# Patient Record
Sex: MALE | URBAN - METROPOLITAN AREA
[De-identification: names, ages, dates, MRNs, and addresses within clinical notes are randomized per-mention and may not be internally consistent; named-entity substitution may affect disease eponyms.]

---

## 2019-06-06 ENCOUNTER — HOSPITAL ENCOUNTER (OUTPATIENT)
Dept: PHYSICAL THERAPY | Age: 44
Setting detail: THERAPIES SERIES
Discharge: HOME | End: 2019-06-06
Payer: COMMERCIAL

## 2019-06-06 PROCEDURE — 97110 THERAPEUTIC EXERCISES: CPT

## 2019-06-06 PROCEDURE — 97161 PT EVAL LOW COMPLEX 20 MIN: CPT

## 2019-06-06 ASSESSMENT — PAIN SCALES - GENERAL: PAINLEVEL_OUTOF10: 2

## 2019-06-06 NOTE — FLOWSHEET NOTE
Physical Therapy Daily Treatment Note    Date:  2019    Patient Name:  Karlos Hewitt    :  1975  MRN: [id number]  Restrictions/Precautions:    Medical/Treatment Diagnosis Information:  · Diagnosis: Pars with spondylolisthesis, lumbar spondylosis  Insurance/Certification information:  PT Insurance Information: Ganesh  Physician Information:  Referring Practitioner: Beatris Kay  Plan of care signed (Y/N):  Y  Visit# / total visits:      G-Code (if applicable): Modified Oswestry Score   At initial evaluation 30% disability     Time in:   11:00      Timed Treatment: 15 Total Treatment Time:  75  ________________________________________________________________________________________    Pain Level:    0/10  SUBJECTIVE:  See initial evaluation    OBJECTIVE:     Exercise/Equipment Resistance/Repetitions Other comments          TA sets with BP cuff       With march       With KFO   Until achieved  x10 B  NV HEP video    Bridging NV    Prone hip extension 10x5 secs without lift HEP video    Lumbo-pelvic stabilization   NV    Clamshells  NV                                                                      TG    NV                           Other Therapeutic Activities:  Posture re-ed training x8 mins. Sleeping with pillows between knees     Manual Treatments:         Modalities:      Test/Measurements:         ASSESSMENT:         Treatment/Activity Tolerance:   ?Patient tolerated treatment well ? Patient limited by fatique  ? Patient limited by pain ? Patient limited by other medical complications  ? Other:     Goals:        Long term goals  Time Frame for Long term goals : 4 weeks   Long term goal 1: Pt independent with HEP   Long term goal 2: Pt B gluteal and hip strength to improve by 1/3 muscle grade  Long term goal 3: Pt sit and stand at least 30 mins without limits to pain  Long term goal 4: Pt return to 90% PLOF without limits to pain. Plan: ? Continue per plan of care ? Alter current plan (see comments)   ? Plan of care initiated ? Hold pending MD visit ?  Discharge      Plan for Next Session:      Re-Certification Due Date:         Signature:  Uriel You

## 2019-06-06 NOTE — PROGRESS NOTES
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 Family / Caregiver Present: No  Referring Practitioner: Tanika Holt  Referral Date : 19  Diagnosis: Pars with spondylolisthesis, lumbar spondylosis  Follows Commands: Within Functional Limits  General Comment  Comments: PLOF:  full activity and workouts daily without limits to pain. Plays softball, basketball and return to running. PT Visit Information  Onset Date: 19  PT Insurance Information: Ganesh  Subjective  Subjective: Pt reports increased pain after running 5 miles while preparing for the pig a couple of months ago. R leg felt 'weird and foot was heavy'. Pt feels achey in the LB B and into B LEs. LB always feels tight and patient has to continue to shift. Pt states issues with sit to stand with ache in the back. Dx with pars, DDD lumbar and arthritis. Possible disc protrusion with SC. Pt states 'I pulled my L hamstring running' prior to hurting his back. Pt reports good flexibility with sports growing up. Possible 'extra disc'. Increased pain with bending over, standing 30 mins, sitting 5-10 mins (needs to shift), but no problems walking until 45 mins then 'fatigue in the LB'. Sits at a desk for work and is limited by pain. No sleeping problems. Increased pain at night when he goes to bed but able to fall sleep. Stiffness in the LB when wakes in the morning. Pain Screening  Patient Currently in Pain: Yes  Pain Assessment  Pain Assessment: 0-10  Pain Level: 2(at worst 5/10 )  Vital Signs  Patient Currently in Pain: Yes    Objective     Observation/Palpation  Posture: Fair  Palpation: Tenderness on B QL, B paraspinals, B gluteals.   Observation: Standing:  Increased B knee varus, forward pelvis with AT. Decreased gluteal and core activation. R pelvic rotation. Body Mechanics: SLS stance good overall B. SLS squat with increased B K' valgus and hip IR, L hip trendelenberg. Squat test:  Increased R weight shift, quad dominance and lumbar extension. AMB:  Increaesed L knee varus, ERS lumbar spine with B trendelenberg. Decreased gluteal activation, femoral amb without B hip extension. Decreased push off. PROM RLE (degrees)  RLE PROM: WNL  AROM RLE (degrees)  RLE AROM: WNL  PROM LLE (degrees)  LLE PROM: WNL  AROM LLE (degrees)  LLE AROM : WNL  Spine  Lumbar: Full AROM all motions but pain with flexion and extension and thigh grabbing flexion return to neutral.   Joint Mobility  Spine: Hypermobility L5-S1  ROM RLE: AGMR L hip  ROM LLE: Hypomobility Hip joint, AGMR L hip    Strength RLE  Strength RLE: WNL  Comment: Hip ER 4-/5; Hip IR 4-/5; K' extension 4+/5; K' flexion 5/5; Eversion 5/5; Great toe 4/5; Psoas 3-/5; PGM 3-/5  Strength LLE  Strength LLE: WNL  Comment: Hip ER 4-/5; Hip IR 4-/5; K' extension 4+/5; K' flexion 5/5; Eversion 5/5; great toe 4/5; Psoas 3-/5; PGM 3-/5      Additional Measures  Flexibility: Decreased L hip IR/ER  Special Tests: (-) clonus, (-) babinski, (+) relevant B slump test.  (-) compression lumbar test.  (+) distraction lumbar test.   Other: DTR B LEs WNL except 0 L achilles. Sensation B LE dermatome equal and WNL. Assessment   Conditions Requiring Skilled Therapeutic Intervention  Body structures, Functions, Activity limitations: Decreased functional mobility ; Decreased ROM; Decreased strength;Decreased endurance  Assessment: Pt presents with symptoms consistent wtih lumbar hypermobility syndrome. Movement impairments including B AGMR, ERS lumbar spine B and HS dominance with hip extension. Standing posture and gait abnormalities also noted.    Prognosis: Good  Decision Making: Low Complexity  REQUIRES PT FOLLOW UP: Yes     Plan   Plan  Times per week: 2x/wk for 4 weeks   Current Treatment Recommendations: Strengthening, Manual Therapy - Joint Manipulation, Gait Training, Patient/Caregiver Education & Training, ROM, Neuromuscular Re-education, Endurance Training, Manual Therapy - Soft Tissue Mobilization, Home Exercise Program    G-Code  Modified Oswestry Score 30% disability     Goals  Long term goals  Time Frame for Long term goals : 4 weeks   Long term goal 1: Pt independent with HEP   Long term goal 2: Pt B gluteal and hip strength to improve by 1/3 muscle grade  Long term goal 3: Pt sit and stand at least 30 mins without limits to pain  Long term goal 4: Pt return to 90% PLOF without limits to pain.         Therapy Time   Individual Concurrent Group Co-treatment   Time In  11:00         Time Out  12:15         Minutes  15                 Kyle Ramírez, [address]

## 2019-06-10 ENCOUNTER — HOSPITAL ENCOUNTER (OUTPATIENT)
Dept: PHYSICAL THERAPY | Age: 44
Setting detail: THERAPIES SERIES
Discharge: HOME | End: 2019-06-10
Payer: COMMERCIAL

## 2019-06-10 PROCEDURE — 97140 MANUAL THERAPY 1/> REGIONS: CPT

## 2019-06-10 PROCEDURE — 97110 THERAPEUTIC EXERCISES: CPT

## 2019-06-10 NOTE — FLOWSHEET NOTE
Physical Therapy Daily Treatment Note    Date:  6/10/2019    Patient Name:  Michaela Coronado    :  1975  MRN: [id number]  Restrictions/Precautions:    Medical/Treatment Diagnosis Information:  · Diagnosis: Pars with spondylolisthesis, lumbar spondylosis  Insurance/Certification information:  PT Insurance Information: Ganesh  Physician Information:  Referring Practitioner: Jason Ramsay  Plan of care signed (Y/N):  Y  Visit# / total visits:  2/8    G-Code (if applicable): Modified Oswestry Score   At initial evaluation 30% disability     Time in:  7:00      Timed Treatment: 30 Total Treatment Time:  30  ________________________________________________________________________________________    Pain Level:    0/10  SUBJECTIVE:  Soreness in the LB after HEP. OBJECTIVE:     Exercise/Equipment Resistance/Repetitions Other comments          TA sets with BP cuff       With march       With KFO   Until achieved  x10 B  NV HEP video    Bridging 10x5 secs  HEP video    Prone hip extension 10x5 secs without lift HEP video    Lumbo-pelvic stabilization   NV    Clamshells 1 6x5 secs B  HEP video           Supine hip IR/ER neuro re-ed    x7 mins B                                                        TG    6 mins                           Other Therapeutic Activities:  Posture re-ed training x8 mins. Sleeping with pillows between knees     Manual Treatments:     Thoracic distraction gr 5 with cavitation. Lumbar gapping L1-3 with cavitation at set up. B LAD and SAD. B LE pulling. R hip flexion MWM. Modalities:      Test/Measurements:         ASSESSMENT: Pt tolerated manual therapy and therex well. Pt demonstrated improved awareness of lumbopelvic stabilization throughout session. Progress POC as patient tolerates. Treatment/Activity Tolerance:   XPatient tolerated treatment well ? Patient limited by fatique  ? Patient limited by pain ?  Patient limited by other medical complications  ? Other:     Goals:       Long term goals  Time Frame for Long term goals : 4 weeks   Long term goal 1: Pt independent with HEP   Long term goal 2: Pt B gluteal and hip strength to improve by 1/3 muscle grade  Long term goal 3: Pt sit and stand at least 30 mins without limits to pain  Long term goal 4: Pt return to 90% PLOF without limits to pain. Plan: X Continue per plan of care ? Alter current plan (see comments)   ? Plan of care initiated ? Hold pending MD visit ?  Discharge      Plan for Next Session:      Re-Certification Due Date:         Signature:  Dandy Luna

## 2019-06-14 ENCOUNTER — HOSPITAL ENCOUNTER (OUTPATIENT)
Dept: PHYSICAL THERAPY | Age: 44
Setting detail: THERAPIES SERIES
Discharge: HOME | End: 2019-06-14
Payer: COMMERCIAL

## 2019-06-14 PROCEDURE — 97140 MANUAL THERAPY 1/> REGIONS: CPT

## 2019-06-14 PROCEDURE — 97110 THERAPEUTIC EXERCISES: CPT

## 2019-06-14 NOTE — FLOWSHEET NOTE
Physical Therapy Daily Treatment Note    Date:  2019    Patient Name:  Radha Marin    :  1975  MRN: [id number]  Restrictions/Precautions:    Medical/Treatment Diagnosis Information:  · Diagnosis: Pars with spondylolisthesis, lumbar spondylosis  Insurance/Certification information:  PT Insurance Information: Ganesh  Physician Information:  Referring Practitioner: Dontae London  Plan of care signed (Y/N):  Y  (8 visits -19)  Visit# / total visits:  3/8    G-Code (if applicable): Modified Oswestry Score   At initial evaluation 30% disability     Time in:  7:28      Timed Treatment: 45 Total Treatment Time:  55  ________________________________________________________________________________________    Pain Level:    0/10  SUBJECTIVE:  Pt reports feeling sore after last session. Pt reports decrease in numbness and discomfort during driving. OBJECTIVE:     Exercise/Equipment Resistance/Repetitions Other comments          TA sets with BP cuff       With march       With KFO   Until achieved  x10 B  x10 B HEP video    Bridging 10x5 secs  HEP video    Prone hip extension  HEP video    Lumbo-pelvic stabilization   x5 mins     Clamshells 2 6x5 secs B  HEP video           Supine hip IR/ER neuro re-ed    x7 mins B    Seated multifidus   10x5 secs  HEP video    Standing multifidus  x15 B maroon TB                                         TG    6 mins                           Other Therapeutic Activities:  Posture re-ed training x8 mins. Sleeping with pillows between knees     Manual Treatments:     Thoracic distraction gr 5 with cavitation. B LAD and SAD. B LE pulling. Hip IR/ER neuro re-ed with resistance. L hip hit technique followed by hip ER MWM. Modalities:      Test/Measurements:         ASSESSMENT:   Progress POC as patient tolerates. Treatment/Activity Tolerance:   XPatient tolerated treatment well ? Patient limited by fatique  ? Patient limited by pain ? Patient limited by other medical complications  ? Other:     Goals:       Long term goals  Time Frame for Long term goals : 4 weeks   Long term goal 1: Pt independent with HEP   Long term goal 2: Pt B gluteal and hip strength to improve by 1/3 muscle grade  Long term goal 3: Pt sit and stand at least 30 mins without limits to pain  Long term goal 4: Pt return to 90% PLOF without limits to pain. Plan: X Continue per plan of care ? Alter current plan (see comments)   ? Plan of care initiated ? Hold pending MD visit ?  Discharge      Plan for Next Session:      Re-Certification Due Date:         Signature:  William Mcgovern

## 2019-06-19 ENCOUNTER — HOSPITAL ENCOUNTER (OUTPATIENT)
Dept: PHYSICAL THERAPY | Age: 44
Setting detail: THERAPIES SERIES
Discharge: HOME | End: 2019-06-19
Payer: COMMERCIAL

## 2019-06-19 PROCEDURE — 97110 THERAPEUTIC EXERCISES: CPT

## 2019-06-21 ENCOUNTER — HOSPITAL ENCOUNTER (OUTPATIENT)
Dept: PHYSICAL THERAPY | Age: 44
Setting detail: THERAPIES SERIES
Discharge: HOME | End: 2019-06-21
Payer: COMMERCIAL

## 2019-06-21 PROCEDURE — 97110 THERAPEUTIC EXERCISES: CPT

## 2019-06-21 NOTE — FLOWSHEET NOTE
Physical Therapy Daily Treatment Note    Date:  2019    Patient Name:  Tramaine Hobbs    :  1975  MRN: [id number]  Restrictions/Precautions:    Medical/Treatment Diagnosis Information:  · Diagnosis: Pars with spondylolisthesis, lumbar spondylosis  Insurance/Certification information:  PT Insurance Information: Ganesh  Physician Information:  Referring Practitioner: Dudley Bell  Plan of care signed (Y/N):  Y  (8 visits -19)  Visit# / total visits:      G-Code (if applicable): Modified Oswestry Score   At initial evaluation 30% disability     Time in:  6:50      Timed Treatment: 45 Total Treatment Time:  55  ________________________________________________________________________________________    Pain Level:    0/10  SUBJECTIVE:  Pt reports feeling good today and had questions about activity participation on his upcoming vacation. OBJECTIVE:     Exercise/Equipment Resistance/Repetitions Other comments          TA sets with BP cuff       With march       With KFO    HEP video    Bridging   HEP video    Prone hip extension  HEP video    Lumbo-pelvic stabilization   x5 mins     Clamshells 2  HEP video    Planks   x5 mins total  HEP    Supine hip IR/ER neuro re-ed        Seated multifidus    HEP video    Standing multifidus  x15 B maroon TB     Quadruped Superman     HEP video    Magnetic Click   HEP video    Supine Single Leg Circles    HEP (next visit)   S' extension       Lateral sling   x15 B maroon TB    TG    6 mins    Posterior sling 6\" step x15 B maroon TB     Dowel yue    x6 mins     Hip ABd machine  Hip Ext machine    NV      Other Therapeutic Activities:      Manual Treatments:              Modalities:      Test/Measurements:         ASSESSMENT:  Pt tolerated therex well today. Pt continued to demonstrate improvements in lumbo-pelvic stabilization during standing exercises by maintaining correct posture throughout therex.  Pt was progressed to dowel yue training to enforce continued education of posture during functional movements. Progress POC as patient tolerates. Treatment/Activity Tolerance:   XPatient tolerated treatment well ? Patient limited by fatique  ? Patient limited by pain ? Patient limited by other medical complications  ? Other:     Goals:       Long term goals  Time Frame for Long term goals : 4 weeks   Long term goal 1: Pt independent with HEP   Long term goal 2: Pt B gluteal and hip strength to improve by 1/3 muscle grade  Long term goal 3: Pt sit and stand at least 30 mins without limits to pain  Long term goal 4: Pt return to 90% PLOF without limits to pain. Plan: X Continue per plan of care ? Alter current plan (see comments)   ? Plan of care initiated ? Hold pending MD visit ?  Discharge      Plan for Next Session:      Re-Certification Due Date:         Signature:  Kandyce Schaumann

## 2019-06-26 ENCOUNTER — APPOINTMENT (OUTPATIENT)
Dept: PHYSICAL THERAPY | Age: 44
End: 2019-06-26
Payer: COMMERCIAL

## 2019-06-28 ENCOUNTER — HOSPITAL ENCOUNTER (OUTPATIENT)
Dept: PHYSICAL THERAPY | Age: 44
Setting detail: THERAPIES SERIES
Discharge: HOME | End: 2019-06-28
Payer: COMMERCIAL

## 2019-06-28 PROCEDURE — 97110 THERAPEUTIC EXERCISES: CPT

## 2019-06-28 NOTE — FLOWSHEET NOTE
Physical Therapy Daily Treatment Note    Date:  2019    Patient Name:  Miladis Stratton    :  1975  MRN: [id number]  Restrictions/Precautions:    Medical/Treatment Diagnosis Information:  · Diagnosis: Pars with spondylolisthesis, lumbar spondylosis  Insurance/Certification information:  PT Insurance Information: Ganesh  Physician Information:  Referring Practitioner: Malik Foster  Plan of care signed (Y/N):  Y  (8 visits -19)  Visit# / total visits:      G-Code (if applicable): Modified Oswestry Score   19  2% disability    At initial evaluation 30% disability     Time in:  6:54      Timed Treatment: 45 Total Treatment Time:  45  ________________________________________________________________________________________    Pain Level:    0/10  SUBJECTIVE:  Pt reports feeling much better and most of the time pain-free. Continues to ache and pain with standing for longer periods of time. OBJECTIVE:     Exercise/Equipment Resistance/Repetitions Other comments          TA sets with BP cuff       With march       With KFO    HEP video    Bridging   HEP video    Prone hip extension  HEP video    Lumbo-pelvic stabilization       Clamshells 2  HEP video    Planks   x5 mins total  HEP    Supine hip IR/ER neuro re-ed        Seated multifidus    HEP video    Standing multifidus  x15 B maroon TB     Quadruped Superman     HEP video    Magnetic Click   HEP video    Supine Single Leg Circles    HEP (next visit)   S' extension   x15 Maroon TB    Lateral sling       TG    x6 mins     Posterior sling 6\" step     Dowel yue        Hip ABd and ER against Wall  x30 sec holds B HEP   Hip ABd machine  Hip Ext machine    15# 2x15  45# 2x15      Other Therapeutic Activities:  Reviewed and performed morning core routine x 10 mins.       Manual Treatments:              Modalities:      Test/Measurements:    Hip and LE strength not tested formally today but improvement noted with standing therex and endurance with core exercises       ASSESSMENT:   Pt demonstrating improvements in lumbo-pelvic stabilization during standing exercises by maintaining correct posture throughout therex. Overall, significant gains with therapy and pain-free 90% of the time. Recommend holding PT for 1-2 weeks and pt continue with HEP of core stabilization exercises and return to working out in the gym. 2 visits remaining on current prescription. Recommend using PRN for flare up over the next 3 weeks. Treatment/Activity Tolerance:   XPatient tolerated treatment well ? Patient limited by fatique  ? Patient limited by pain ? Patient limited by other medical complications  ? Other:     Goals:       Long term goals  Time Frame for Long term goals : 4 weeks   Long term goal 1: Pt independent with HEP (met)  Long term goal 2: Pt B gluteal and hip strength to improve by 1/3 muscle grade (improved)  Long term goal 3: Pt sit and stand at least 30 mins without limits to pain  (met)  Long term goal 4: Pt return to 90% PLOF without limits to pain.  (met)    Plan: X Continue per plan of care ? Alter current plan (see comments)   ? Plan of care initiated ? Hold pending MD visit ?  Discharge      Plan for Next Session:      Re-Certification Due Date:         Signature:  Elyssa Cisneros

## 2019-06-28 NOTE — FLOWSHEET NOTE
Physical Therapy Daily Treatment Note    Date:  2019    Patient Name:  Bri Rivers    :  1975  MRN: [id number]  Restrictions/Precautions:    Medical/Treatment Diagnosis Information:  · Diagnosis: Pars with spondylolisthesis, lumbar spondylosis  Insurance/Certification information:  PT Insurance Information: Ganesh  Physician Information:  Referring Practitioner: Ekta Castorena  Plan of care signed (Y/N):  Y  (8 visits -19)  Visit# / total visits:      G-Code (if applicable): Modified Oswestry Score   19  2% disability    At initial evaluation 30% disability     Time in:  6:54      Timed Treatment: 45 Total Treatment Time:  45  ________________________________________________________________________________________    Pain Level:    0/10  SUBJECTIVE:  Pt reports feeling much better and most of the time pain-free. Continues to ache and pain with standing for longer periods of time. OBJECTIVE:   Test/Measurements:    Hip and LE strength not tested formally today but improvement noted with standing therex and endurance with core exercises       ASSESSMENT:   Pt demonstrating improvements in lumbo-pelvic stabilization during standing exercises by maintaining correct posture throughout therex. Overall, significant gains with therapy and pain-free 90% of the time. Recommend holding PT for 1-2 weeks and pt continue with HEP of core stabilization exercises and return to working out in the gym. 2 visits remaining on current prescription. Recommend using PRN for flare up over the next 3 weeks. Treatment/Activity Tolerance:   XPatient tolerated treatment well ? Patient limited by fatique  ? Patient limited by pain ? Patient limited by other medical complications  ?  Other:     Goals:       Long term goals  Time Frame for Long term goals : 4 weeks   Long term goal 1: Pt independent with HEP (met)  Long term goal 2: Pt B gluteal and hip strength to improve by 1/3 muscle grade (improved)  Long term goal 3: Pt sit and stand at least 30 mins without limits to pain  (met)  Long term goal 4: Pt return to 90% PLOF without limits to pain.  (met)    Plan: X Continue per plan of care ? Alter current plan (see comments)   ? Plan of care initiated ? Hold pending MD visit ?  Discharge      Plan for Next Session:      Re-Certification Due Date:         Signature:  Ceci Ham

## 2019-07-08 ENCOUNTER — HOSPITAL ENCOUNTER (OUTPATIENT)
Dept: PHYSICAL THERAPY | Age: 44
Setting detail: THERAPIES SERIES
Discharge: HOME | End: 2019-07-08
Payer: COMMERCIAL

## 2025-02-06 ENCOUNTER — OFFICE VISIT (OUTPATIENT)
Dept: ORTHOPEDIC SURGERY | Age: 50
End: 2025-02-06
Payer: COMMERCIAL

## 2025-02-06 DIAGNOSIS — M25.511 RIGHT SHOULDER PAIN, UNSPECIFIED CHRONICITY: Primary | ICD-10-CM

## 2025-02-06 DIAGNOSIS — M54.12 CERVICAL RADICULOPATHY: ICD-10-CM

## 2025-02-06 DIAGNOSIS — M75.41 ROTATOR CUFF IMPINGEMENT SYNDROME OF RIGHT SHOULDER: ICD-10-CM

## 2025-02-06 PROCEDURE — 99204 OFFICE O/P NEW MOD 45 MIN: CPT | Performed by: ORTHOPAEDIC SURGERY

## 2025-02-06 RX ORDER — APIXABAN 5 MG/1
5 TABLET, FILM COATED ORAL 2 TIMES DAILY
COMMUNITY
Start: 2024-06-20

## 2025-02-06 RX ORDER — EMPAGLIFLOZIN 25 MG/1
TABLET, FILM COATED ORAL
COMMUNITY
Start: 2025-01-11

## 2025-02-06 RX ORDER — LOSARTAN POTASSIUM 25 MG/1
TABLET ORAL
COMMUNITY
Start: 2025-01-12

## 2025-02-06 RX ORDER — METHOCARBAMOL 750 MG/1
TABLET, FILM COATED ORAL
COMMUNITY
Start: 2024-11-13

## 2025-02-06 NOTE — PROGRESS NOTES
[facility]  History and Physical  Shoulder Pain    Date:  2025    Name:  Carroll Torres  Address:  [address] [place] [id number]    :  1975      Age:   49 y.o.    SSN:  [id number]      Medical Record Number:  [id number]    Reason for Visit:    Shoulder Pain (NP RIGHT SHOULDER)      HPI:   Carroll Torres is a 49 y.o. male who presents to our office today on referral from Dr. Granado complaining of  right shoulder pain.  This patient has noticed increased pain for over the last 4 months. He has noticed it more when he has been working out in the gym with weights and also when he is more active.  The patient reports he has used ice heat and Tylenol for comfort.  Patient is a type II diabetic with good control.  He also has 1 kidney and is currently on [employer] which precludes him from taking any oral NSAIDs.  Does take curcumin and uses Voltaren gel as needed.    The patient denies any injuries that contributed to his current symptoms.  He adds that 4 years ago he was seen and evaluated by an orthopaedic surgeon who mentioned to him that he had a rotator cuff tear in the shoulder and urged him to have surge. However his PCP advised to at least try a course of conservative treatment. He did so and this was successful for the patient.  He has never had surgery on this right shoulder.      His recent symptoms prompted his PCP to order an MRI recently for the right shoulder and he would like to review those results with us today.  Additionally, the patient does have numbness and tingling in his right hand and arm.    Pain Assessment  Location of Pain: Shoulder  Location Modifiers: Right  Quality of Pain: Sharp  Duration of Pain: Persistent  Frequency of Pain: Constant  Limiting Behavior: Yes  Relieving Factors: Ice, Heat  Result of Injury: No  Work-Related Injury: No  Are there other pain locations you wish to document?: No    Review of Systems:  A